# Patient Record
Sex: MALE | Race: WHITE | NOT HISPANIC OR LATINO | Employment: UNEMPLOYED
[De-identification: names, ages, dates, MRNs, and addresses within clinical notes are randomized per-mention and may not be internally consistent; named-entity substitution may affect disease eponyms.]

---
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
## 2023-10-26 ENCOUNTER — OFFICE VISIT (OUTPATIENT)
Dept: URGENT CARE | Facility: URGENT CARE | Age: 18
End: 2023-10-26

## 2023-10-26 VITALS
BODY MASS INDEX: 23.73 KG/M2 | OXYGEN SATURATION: 96 % | SYSTOLIC BLOOD PRESSURE: 120 MMHG | TEMPERATURE: 97.5 F | DIASTOLIC BLOOD PRESSURE: 67 MMHG | RESPIRATION RATE: 14 BRPM | HEART RATE: 60 BPM | WEIGHT: 175 LBS

## 2023-10-26 DIAGNOSIS — J06.9 VIRAL URI WITH COUGH: Primary | ICD-10-CM

## 2023-10-26 PROCEDURE — 99213 OFFICE O/P EST LOW 20 MIN: CPT | Performed by: PHYSICIAN ASSISTANT

## 2023-10-26 RX ORDER — AMOXICILLIN 875 MG
TABLET ORAL
COMMUNITY
Start: 2023-10-23

## 2023-10-26 RX ORDER — BENZONATATE 200 MG/1
200 CAPSULE ORAL 3 TIMES DAILY PRN
Qty: 30 CAPSULE | Refills: 0 | Status: SHIPPED | OUTPATIENT
Start: 2023-10-26

## 2023-10-26 NOTE — LETTER
October 26, 2023      Driss Boyer  [address]   [place] [address]        To Whom It May Concern:    Driss Boyer  was seen on Thursday, October 26, 2023.  Please excuse him  until tomorrow due to illness, however if he is still feeling ill tomorrow would advise excusing him 1 additional day,        Sincerely,        Harry Manjarrez PA-C

## 2023-10-26 NOTE — PROGRESS NOTES
Assessment & Plan     Viral URI with cough  Following lengthy discussion with patient and review of outside emergency department documentation and diagnostic results, patient does not have any significant change or worsening of symptoms following previous work-ups, 2 of which were conducted this week.  I cannot find documentation of a positive RSV test, although symptoms would be consistent with RSV infection.  Patient had received oral Decadron in his emergency department visit last week.  Right now he does not have any symptoms or findings that would suggest recurrent treatment with this.  Patient is primarily interested in obtaining a work note to excuse him due to illness, I provided this today.  We did discuss the utility of repeat testing of chest radiograph and/or labs which following shared decision making opted not to perform today.  We did discuss that should the patient not have any improvement in symptoms over the course of the next 1 to 2 weeks, or at any time he has worsening symptoms, to return for reevaluation and additional testing.  - benzonatate (TESSALON) 200 MG capsule  Dispense: 30 capsule; Refill: 0     I spent a total of 23 minutes on the day of the visit.   Time spent by me doing chart review, history and exam, documentation and further activities per the note    Return in about 5 days (around 10/31/2023) for reevaluation with PCP if symptoms not improving.    Harry Manjarrez PA-C  [facility] [place]      Shabbir Naqvi is a 18 year old male who presents to clinic today for the following health issues:  Chief Complaint   Patient presents with    Urgent Care     DX with RSV on Tuesday, feels like symptoms are getting worse, having sweats and chills, coughing, ear pain left side, fatigued, sob with activity, wheezing   Using inhaler and antibiotics , need doctors note for work      HPI  Patient is a 18-year-old male presenting to urgent care for evaluation of upper  respiratory infectious symptoms.  Patient has been ill for approximately 2 weeks, seen in emergency department in [facility] 10 days ago, at that time chest radiograph and laboratory testing including influenza and COVID were both negative.  Was given Decadron in the ED and discharged home.  Patient was seen in ED again 3 days ago and diagnosed with left ear otitis media and started on antibiotics, started taking amoxicillin yesterday.  Patient returns today because he is still sick with a recurrent cough, but notes that symptoms have not worsened or significantly changed.  Patient states that he was seen in a clinic in the last couple days and diagnosed with RSV by laboratory testing, I cannot find this in the medical record.  Patient is not on any other medications for infection at this time.  Notes some occasional warmth sensations, but otherwise denies fever/chills, nausea/vomiting, lightheadedness, chest pain, dyspnea, leg swelling/edema, or other complaints.    Review of Systems  Focused ROS obtained, pertinent positives/negatives reviewed in the HPI.       Objective    /67   Pulse 60   Temp 97.5  F (36.4  C) (Oral)   Resp 14   Wt 79.4 kg (175 lb)   SpO2 96%   BMI 23.73 kg/m    Physical Exam   GENERAL: healthy, alert and no distress  HENT: normal cephalic/atraumatic, ear canals and TM's normal, nose and mouth without ulcers or lesions, oropharynx clear, oral mucous membranes moist, tonsillar erythema, and sinuses: not tender  NECK: no adenopathy  RESP: lungs clear to auscultation - no rales, rhonchi or wheezes  CV: regular rates and rhythm, normal S1 S2, no S3 or S4, and no peripheral edema  SKIN: no suspicious lesions or rashes

## 2023-10-26 NOTE — PATIENT INSTRUCTIONS
Use medication as directed for cough, would also consider using a nighttime with cough/cold medication to assist with sleep.  Continue monitoring for symptomatic improvement.  You may return to work once you are feeling as if you are improving, and did not have a fever for at least 24 hours without the use of Tylenol or ibuprofen.  Follow-up with primary care provider next week if you are still not improving.

## 2024-09-19 ENCOUNTER — HOSPITAL ENCOUNTER (EMERGENCY)
Facility: CLINIC | Age: 19
End: 2024-09-19

## 2024-09-26 NOTE — TELEPHONE ENCOUNTER
New Appointment Needed  What is the reason for the visit:    Same Date/Next Day Appt Request  What is the reason for your visit?: Patient was scheduled for 02/05/20 @3:00pm and it was cancelled parent called and ask why his apptointment was cancelled when parent never cancelled appointment.ROSANNE LOCK HAS APPOINTMENT AT 3:40PM    Provider Preference: PCP only  How soon do you need to be seen?: tomorrow @3:00 Parent Ambar Lock has appt at 3:40pm also.  Waitlist offered?: No  Okay to leave a detailed message:  Yes     [Normal Appearance] : normal appearance [Well Groomed] : well groomed [General Appearance - In No Acute Distress] : no acute distress [Edema] : no peripheral edema [Respiration, Rhythm And Depth] : normal respiratory rhythm and effort [Exaggerated Use Of Accessory Muscles For Inspiration] : no accessory muscle use [Abdomen Soft] : soft [Abdomen Tenderness] : non-tender [Costovertebral Angle Tenderness] : no ~M costovertebral angle tenderness [Urinary Bladder Findings] : the bladder was normal on palpation [Normal Station and Gait] : the gait and station were normal for the patient's age [] : no rash [No Focal Deficits] : no focal deficits [Oriented To Time, Place, And Person] : oriented to person, place, and time [Affect] : the affect was normal [Mood] : the mood was normal [No Palpable Adenopathy] : no palpable adenopathy